# Patient Record
(demographics unavailable — no encounter records)

---

## 2017-04-13 NOTE — US
EXAMINATION TYPE: US abdomen complete

 

DATE OF EXAM: 4/13/2017 8:58 AM

 

COMPARISON: 6/16/2016

 

CLINICAL HISTORY: K76.89 Other Specified Disease of Liver. Patients states a MRI showed liver cyst, n
ot seen on previous US.  

 

EXAM MEASUREMENTS:

 

Liver Length:  14.9 cm   

Gallbladder Wall:  0.2 cm   

CBD:  0.2 cm

Spleen:  10.6 cm   

Right Kidney:  10.0 x 4.7 x 4.7  cm 

Left Kidney:  10.3 x 4.6 x 4.3 cm   

 

 

 

Pancreas:  tail not well seen due to overlying bowel gas

Liver:  slightly echogenic  

Gallbladder:  wnl

**Evidence for sonographic Nunes's sign:  neg

CBD:  wnl 

Spleen:  wnl   

Right Kidney:  wnl   

Left Kidney:  wnl   

Upper IVC:  seen  

Abd Aorta:  seen

 

 

 

The liver is slightly echogenic which may reflect fatty hepatic infiltration. The intrahepatic portio
n of the IVC and proximal abdominal aorta are within normal limits.  There is no evidence of cholelit
hiasis.  Common bile duct is unremarkable.  The visualized portions of the pancreas are homogenous.  
The spleen is unremarkable.  Kidneys are symmetric and free of hydronephrosis.  No renal lesions are 
seen.

 

IMPRESSION: Mild fatty hepatic infiltration.

## 2017-11-29 NOTE — MM
Reason for exam: screening  (asymptomatic).

Last mammogram was performed 1 year and 2 months ago.



History:

Patient is postmenopausal.

Family history of breast cancer in aunt at age 70.

Took estrogen beginning at age 45.



Physical Findings:

A clinical breast exam by your physician is recommended on an annual basis and 

results should be correlated with mammographic findings.



MG 3D Screening Mammo W/Cad

Bilateral CC and MLO view(s) were taken.

Prior study comparison: September 13, 2016, bilateral MG 3d screening mammo w/cad.

July 10, 2015, bilateral MG screening mammo w CAD.

The breast tissue is heterogeneously dense. This may lower the sensitivity of 

mammography.  There is no discrete abnormality.  No significant changes when 

compared with prior studies.





ASSESSMENT: Negative, BI-RAD 1



RECOMMENDATION:

Routine screening mammogram of both breasts in 1 year.